# Patient Record
Sex: FEMALE | Race: WHITE | NOT HISPANIC OR LATINO | URBAN - METROPOLITAN AREA
[De-identification: names, ages, dates, MRNs, and addresses within clinical notes are randomized per-mention and may not be internally consistent; named-entity substitution may affect disease eponyms.]

---

## 2019-01-21 ENCOUNTER — INITIAL CONSULT (OUTPATIENT)
Dept: PSYCHIATRY | Facility: CLINIC | Age: 79
End: 2019-01-21
Payer: COMMERCIAL

## 2019-01-21 VITALS — WEIGHT: 156 LBS | SYSTOLIC BLOOD PRESSURE: 163 MMHG | DIASTOLIC BLOOD PRESSURE: 74 MMHG | HEART RATE: 63 BPM

## 2019-01-21 DIAGNOSIS — F34.1 DYSTHYMIC DISORDER: ICD-10-CM

## 2019-01-21 DIAGNOSIS — F33.2 MAJOR DEPRESSIVE DISORDER, RECURRENT, SEVERE WITHOUT PSYCHOTIC FEATURES: Primary | ICD-10-CM

## 2019-01-21 DIAGNOSIS — I10 ESSENTIAL HYPERTENSION: ICD-10-CM

## 2019-01-21 PROCEDURE — 3078F DIAST BP <80 MM HG: CPT | Mod: CPTII,S$GLB,, | Performed by: PSYCHIATRY & NEUROLOGY

## 2019-01-21 PROCEDURE — 99999 PR PBB SHADOW E&M-NEW PATIENT-LVL III: CPT | Mod: PBBFAC,,, | Performed by: PSYCHIATRY & NEUROLOGY

## 2019-01-21 PROCEDURE — 3077F PR MOST RECENT SYSTOLIC BLOOD PRESSURE >= 140 MM HG: ICD-10-PCS | Mod: CPTII,S$GLB,, | Performed by: PSYCHIATRY & NEUROLOGY

## 2019-01-21 PROCEDURE — 3078F PR MOST RECENT DIASTOLIC BLOOD PRESSURE < 80 MM HG: ICD-10-PCS | Mod: CPTII,S$GLB,, | Performed by: PSYCHIATRY & NEUROLOGY

## 2019-01-21 PROCEDURE — 99999 PR PBB SHADOW E&M-NEW PATIENT-LVL III: ICD-10-PCS | Mod: PBBFAC,,, | Performed by: PSYCHIATRY & NEUROLOGY

## 2019-01-21 PROCEDURE — 3077F SYST BP >= 140 MM HG: CPT | Mod: CPTII,S$GLB,, | Performed by: PSYCHIATRY & NEUROLOGY

## 2019-01-21 PROCEDURE — 99205 PR OFFICE/OUTPT VISIT, NEW, LEVL V, 60-74 MIN: ICD-10-PCS | Mod: S$GLB,,, | Performed by: PSYCHIATRY & NEUROLOGY

## 2019-01-21 PROCEDURE — 99205 OFFICE O/P NEW HI 60 MIN: CPT | Mod: S$GLB,,, | Performed by: PSYCHIATRY & NEUROLOGY

## 2019-01-21 RX ORDER — ALPRAZOLAM 0.5 MG/1
0.5 TABLET ORAL 2 TIMES DAILY PRN
COMMUNITY

## 2019-01-21 RX ORDER — SPIRONOLACTONE 25 MG/1
25 TABLET ORAL EVERY MORNING
COMMUNITY

## 2019-01-21 RX ORDER — METOPROLOL SUCCINATE 25 MG/1
25 TABLET, EXTENDED RELEASE ORAL EVERY MORNING
COMMUNITY

## 2019-01-21 RX ORDER — ATORVASTATIN CALCIUM 40 MG/1
40 TABLET, FILM COATED ORAL NIGHTLY
COMMUNITY

## 2019-01-21 RX ORDER — ARIPIPRAZOLE 2 MG/1
5 TABLET ORAL EVERY MORNING
COMMUNITY

## 2019-01-21 RX ORDER — MIRTAZAPINE 15 MG/1
15 TABLET, FILM COATED ORAL NIGHTLY
COMMUNITY

## 2019-01-21 NOTE — PATIENT INSTRUCTIONS
Potential benefits, alternatives, and risks of ECT were described in detail, including the possibility of death, stroke, MCI, burst aneurysm, dental or TMJ injury, aspiration, muscle aches, headache, memory loss, and others.  Pt. Was told that the literature supports a 60-70% success rate for ECT even in patients who have failed multiple medications.    She understands that she will continue to take medications through and after ECT, although not necessarily the ones she is on now.    She was informed that she will not be able to drive or conduct business while tx is in progress.    Incompatibility of benzodiazepines with ECT was covered.  She will ask Dr. Holder to supervise a safe taper off of Xanax before ECT begins.  She was informed that ECT is not a treatment for anxiety.    Pt. Was invited to come the the ER for admission if she ever feels she might hurt herself or others.

## 2019-01-21 NOTE — Clinical Note
Pt or family will call if they decide to proceed with ECT.  She will need a medical clearance form and clearance will need to be by a Cardiologist.  Please mail a copy of this not to Dr. Holder.

## 2019-01-21 NOTE — PROGRESS NOTES
Outpatient Psychiatry Initial Visit (MD/NP)    1/21/2019    Odalis GONSALEZ Faia, a 78 y.o. female, presenting for initial evaluation visit. Met with patient, sister and spouse.    Reason for Encounter: Consult from Dr. Chris Holder. Patient complains of   Chief Complaint   Patient presents with    Depression    Anhedonia    Irritability    Psychomotor Retardation    Anergia    Anxiety   .    History of Present Illness:  Ms. 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 Holder to determine whether ECT might be of value in treating persistent depression.  Pt reports that she has been depressed more days than not for the past 6 years since the death of her only son from heart failure.  She reported that she has been continually depressed for the past year.  Symptoms include: sadness, desire to cry, anger, irritability, anhedonia, hopelessness, poor concentration, lack of motivation and energy, isolation, rumination, a 40 lb wt loss over 1 year, and mid-insomnia despite medication.  She denies suicidal or violent thoughts.  Additional anxiety symptoms include compulsive checking without obsessive thoughts, social anxiety, and generalized anxiety.  She denies kapil, hypomania, hallucinations, or delusions, and reports little benefit from current medications.    Past Psychiatric History:  Pt describes chaos and argument between her parents when she was a child.  She denies physical or sexual abuse.  She states she began taking medications for anxiety in her 20's and first became depressed around age 59 while trying to decide on a move to a new home.  She was first followed by a psychiatrist at that time and recovered on unknown medication.  She became depressed and anxious again 6 years ago following the death of her son.  She has had individual psychotherapy, but has never been suicidal, homicidal, or been admitted to a psychiatric hospital or day program.  Past medication  trials include trazodone, Trintellix, Lexapro, and other medicines whose names are not recalled.  She has never had TMS or ECT.    Review Of Systems:     GENERAL:  40 lb wt loss  SKIN:  No rashes or lacerations  HEAD:  No headaches  EYES:  S/p cataract surgery  EARS:  No dizziness, tinnitus or hearing loss  NOSE:  No changes in smell  MOUTH & THROAT:  No dyskinetic movements or obvious goiter  CHEST:  No shortness of breath, hyperventilation or cough  CARDIOVASCULAR:  HTN.  Possible hx of angina 1 year ago.  ABDOMEN:  No nausea, vomiting, pain, constipation or diarrhea  URINARY:  No frequency, dysuria or sexual dysfunction  ENDOCRINE:  No polydipsia, polyuria  MUSCULOSKELETAL:  No pain or stiffness of the joints  NEUROLOGIC:  No weakness, sensory changes, seizures, confusion, memory loss, tremor or other abnormal movements    Current Evaluation:     Nutritional Screening: Considering the patient's height and weight, medications, medical history and preferences, should a referral be made to the dietitian? no    Constitutional  Vitals:  Most recent vital signs, dated less than 90 days prior to this appointment, were reviewed.    Vitals:    01/21/19 1334 01/21/19 1510   BP: (!) 162/67 (!) 163/74   Pulse: 68 63   Weight: 70.8 kg (156 lb)         General:  age appropriate, casually dressed     Musculoskeletal  Muscle Strength/Tone:  no rigidity, no dyskinesia, no dystonia, no tremor   Gait & Station:  non-ataxic     Psychiatric  Speech:  no latency; no press, soft, spontaneous   Mood & Affect:  depressed  mood-congruent   Thought Process:  goal-directed, logical   Associations:  intact   Thought Content:  normal, no suicidality, no homicidality, delusions, or paranoia   Insight:  has awareness of illness   Judgement: behavior is adequate to circumstances   Orientation:  grossly intact   Memory: intact for content of interview   Language: grossly intact   Attention Span & Concentration:  able to focus   Fund of Knowledge:   intact and appropriate to age and level of education       Relevant Elements of Neurological Exam: normal gait    Functioning in Relationships:  Spouse/partner:  Supportive.  Peers:  1 friend outside family.  Employers: Homemaker.    Laboratory Data  No visits with results within 1 Month(s) from this visit.   Latest known visit with results is:   No results found for any previous visit.         Medications  Outpatient Encounter Medications as of 1/21/2019   Medication Sig Dispense Refill    ALPRAZolam (XANAX) 0.5 MG tablet Take 0.5 mg by mouth 2 (two) times daily as needed for Anxiety.      ARIPiprazole (ABILIFY) 2 MG Tab Take 5 mg by mouth every morning.      atorvastatin (LIPITOR) 40 MG tablet Take 40 mg by mouth every evening.      metoprolol succinate (TOPROL-XL) 25 MG 24 hr tablet Take 25 mg by mouth every morning.      mirtazapine (REMERON) 15 MG tablet Take 15 mg by mouth every evening.      spironolactone (ALDACTONE) 25 MG tablet Take 25 mg by mouth every morning.       No facility-administered encounter medications on file as of 1/21/2019.            Assessment - Diagnosis - Goals:     Impression: Pt has MDD without psychosis superimposed on Dysthymic Disorder, poorly responsive to medications.  She is a candidate for ECT pending cardiac clearance.      ICD-10-CM ICD-9-CM   1. Major depressive disorder, recurrent, severe without psychotic features F33.2 296.33   2. Dysthymic disorder F34.1 300.4   3. Essential hypertension I10 401.9       Strengths and Liabilities: Strength: Patient accepts guidance/feedback, Strength: Patient is motivated for change., Strength: Patient has positive support network., Strength: Patient has reasonable judgment.    Treatment Goals:  Specify outcomes written in observable, behavioral terms:   Depression: eliminating all depressive symptoms (BDI score <10 for 1 month)    Treatment Plan/Recommendations:   · Medication Management: Medications will remain under supervision  of Dr. Holder until ECT begins.  She should taper off Xanax by 1 week prior to 1st treatment.   · Pt and her family will discuss ECT and contact Adrianna Dudley to schedule a start date if they decide to proceed.      Return to Clinic: for ECT    Counseling time: 65 min  Total time: 75 min.    Consulting clinician was informed of the encounter and consult note.

## 2019-02-12 ENCOUNTER — TELEPHONE (OUTPATIENT)
Dept: PSYCHIATRY | Facility: CLINIC | Age: 79
End: 2019-02-12

## 2019-02-12 NOTE — TELEPHONE ENCOUNTER
"----- Message from Adrianna Dudley RN sent at 2/11/2019 12:25 PM CST -----  Contact: pt spouse   Spoke with patient and her  today regarding their hesitance with proceeding with ECT. They said they think they "came on really strong to you about her symptoms, but do not feel things are as bad that ECT is the next step they want to make at this time." They are however  wanting to move on from Dr. Figueredo regarding med management. They were wanting to see if you would take her on as a patient of yours. I explained that you were now part time and did not have the availability for new patients in the schedule, but I would reach out to you for your recommendations. They wanted to know your recommendation in our department if they were to transfer ? They also mentioned TMS. Wanted your direct input if they should seek an appointment for TMS consultation prior to switching psychiatrists ?I told them I would call them back with your input.   ----- Message -----  From: Maricruz Greene  Sent: 2/7/2019  12:02 PM  To: Adrianna Dudley RN    Calling has a couple of questions regarding treatment/   His cb #   [phone]    "

## 2019-02-12 NOTE — TELEPHONE ENCOUNTER
----- Message from Leisa Arciniega MA sent at 2/11/2019 11:20 AM CST -----  [phone]    Please call Christian coker.  He has a question.

## 2025-01-08 ENCOUNTER — OUTSIDE PLACE OF SERVICE (OUTPATIENT)
Dept: ADMINISTRATIVE | Facility: OTHER | Age: 85
End: 2025-01-08
Payer: COMMERCIAL

## 2025-01-08 PROCEDURE — 99305 1ST NF CARE MODERATE MDM 35: CPT | Mod: ,,, | Performed by: FAMILY MEDICINE

## 2025-01-29 ENCOUNTER — HOSPITAL ENCOUNTER (EMERGENCY)
Facility: HOSPITAL | Age: 85
End: 2025-01-30
Attending: STUDENT IN AN ORGANIZED HEALTH CARE EDUCATION/TRAINING PROGRAM
Payer: MEDICARE

## 2025-01-29 DIAGNOSIS — R29.818 ACUTE FOCAL NEUROLOGICAL DEFICIT: ICD-10-CM

## 2025-01-29 DIAGNOSIS — R29.810 FACIAL DROOP: Primary | ICD-10-CM

## 2025-01-29 PROBLEM — R53.1 LEFT-SIDED WEAKNESS: Status: ACTIVE | Noted: 2025-01-29

## 2025-01-29 PROBLEM — I10 HYPERTENSION: Status: ACTIVE | Noted: 2022-06-21

## 2025-01-29 PROBLEM — G81.91 HEMIPARESIS OF RIGHT DOMINANT SIDE: Status: ACTIVE | Noted: 2023-12-07

## 2025-01-29 PROBLEM — F32.9 DEPRESSION, MAJOR: Status: ACTIVE | Noted: 2022-06-21

## 2025-01-29 PROBLEM — E78.00 HYPERCHOLESTEROLEMIA: Status: ACTIVE | Noted: 2025-01-29

## 2025-01-29 PROBLEM — N18.30 STAGE 3 CHRONIC KIDNEY DISEASE: Status: ACTIVE | Noted: 2022-06-21

## 2025-01-29 PROBLEM — G36.0 NEUROMYELITIS OPTICA: Status: ACTIVE | Noted: 2023-11-13

## 2025-01-29 PROBLEM — F51.01 PRIMARY INSOMNIA: Status: ACTIVE | Noted: 2022-06-21

## 2025-01-29 PROBLEM — I95.1 ORTHOSTATIC HYPOTENSION: Status: ACTIVE | Noted: 2024-12-16

## 2025-01-29 PROBLEM — Z86.73 HISTORY OF TIA (TRANSIENT ISCHEMIC ATTACK): Status: ACTIVE | Noted: 2024-02-05

## 2025-01-29 LAB
ALBUMIN SERPL BCP-MCNC: 3.4 G/DL (ref 3.5–5.2)
ALP SERPL-CCNC: 57 U/L (ref 40–150)
ALT SERPL W/O P-5'-P-CCNC: 13 U/L (ref 10–44)
ANION GAP SERPL CALC-SCNC: 10 MMOL/L (ref 8–16)
AST SERPL-CCNC: 18 U/L (ref 10–40)
BASOPHILS # BLD AUTO: 0.04 K/UL (ref 0–0.2)
BASOPHILS NFR BLD: 0.4 % (ref 0–1.9)
BILIRUB SERPL-MCNC: 0.3 MG/DL (ref 0.1–1)
BUN SERPL-MCNC: 18 MG/DL (ref 8–23)
CALCIUM SERPL-MCNC: 8.8 MG/DL (ref 8.7–10.5)
CHLORIDE SERPL-SCNC: 106 MMOL/L (ref 95–110)
CHOLEST SERPL-MCNC: 129 MG/DL (ref 120–199)
CHOLEST/HDLC SERPL: 2.9 {RATIO} (ref 2–5)
CO2 SERPL-SCNC: 26 MMOL/L (ref 23–29)
CREAT SERPL-MCNC: 1 MG/DL (ref 0.5–1.4)
CREAT SERPL-MCNC: 1.1 MG/DL (ref 0.5–1.4)
DIFFERENTIAL METHOD BLD: ABNORMAL
EOSINOPHIL # BLD AUTO: 0.1 K/UL (ref 0–0.5)
EOSINOPHIL NFR BLD: 1.2 % (ref 0–8)
ERYTHROCYTE [DISTWIDTH] IN BLOOD BY AUTOMATED COUNT: 12.9 % (ref 11.5–14.5)
EST. GFR  (NO RACE VARIABLE): 55.9 ML/MIN/1.73 M^2
GLUCOSE SERPL-MCNC: 131 MG/DL (ref 70–110)
HCT VFR BLD AUTO: 36.5 % (ref 37–48.5)
HCV AB SERPL QL IA: NORMAL
HDLC SERPL-MCNC: 44 MG/DL (ref 40–75)
HDLC SERPL: 34.1 % (ref 20–50)
HGB BLD-MCNC: 12.3 G/DL (ref 12–16)
HIV 1+2 AB+HIV1 P24 AG SERPL QL IA: NORMAL
IMM GRANULOCYTES # BLD AUTO: 0.04 K/UL (ref 0–0.04)
IMM GRANULOCYTES NFR BLD AUTO: 0.4 % (ref 0–0.5)
INR PPP: 1 (ref 0.8–1.2)
LDLC SERPL CALC-MCNC: 65.2 MG/DL (ref 63–159)
LYMPHOCYTES # BLD AUTO: 2.1 K/UL (ref 1–4.8)
LYMPHOCYTES NFR BLD: 22.9 % (ref 18–48)
MCH RBC QN AUTO: 30.4 PG (ref 27–31)
MCHC RBC AUTO-ENTMCNC: 33.7 G/DL (ref 32–36)
MCV RBC AUTO: 90 FL (ref 82–98)
MONOCYTES # BLD AUTO: 0.9 K/UL (ref 0.3–1)
MONOCYTES NFR BLD: 10 % (ref 4–15)
NEUTROPHILS # BLD AUTO: 6 K/UL (ref 1.8–7.7)
NEUTROPHILS NFR BLD: 65.1 % (ref 38–73)
NONHDLC SERPL-MCNC: 85 MG/DL
NRBC BLD-RTO: 0 /100 WBC
PLATELET # BLD AUTO: 244 K/UL (ref 150–450)
PMV BLD AUTO: 10.5 FL (ref 9.2–12.9)
POC PTINR: 1.3 (ref 0.9–1.2)
POC PTWBT: 12.8 SEC (ref 9.7–14.3)
POCT GLUCOSE: 144 MG/DL (ref 70–110)
POTASSIUM SERPL-SCNC: 3.5 MMOL/L (ref 3.5–5.1)
PROT SERPL-MCNC: 6 G/DL (ref 6–8.4)
PROTHROMBIN TIME: 11.3 SEC (ref 9–12.5)
RBC # BLD AUTO: 4.05 M/UL (ref 4–5.4)
SAMPLE: ABNORMAL
SAMPLE: NORMAL
SODIUM SERPL-SCNC: 142 MMOL/L (ref 136–145)
T4 FREE SERPL-MCNC: 1.03 NG/DL (ref 0.71–1.51)
TRIGL SERPL-MCNC: 99 MG/DL (ref 30–150)
TSH SERPL DL<=0.005 MIU/L-ACNC: 4.34 UIU/ML (ref 0.4–4)
WBC # BLD AUTO: 9.29 K/UL (ref 3.9–12.7)

## 2025-01-29 PROCEDURE — 93010 ELECTROCARDIOGRAM REPORT: CPT | Mod: ,,, | Performed by: INTERNAL MEDICINE

## 2025-01-29 PROCEDURE — 93005 ELECTROCARDIOGRAM TRACING: CPT

## 2025-01-29 PROCEDURE — 85025 COMPLETE CBC W/AUTO DIFF WBC: CPT | Performed by: STUDENT IN AN ORGANIZED HEALTH CARE EDUCATION/TRAINING PROGRAM

## 2025-01-29 PROCEDURE — 82565 ASSAY OF CREATININE: CPT

## 2025-01-29 PROCEDURE — 80053 COMPREHEN METABOLIC PANEL: CPT | Performed by: STUDENT IN AN ORGANIZED HEALTH CARE EDUCATION/TRAINING PROGRAM

## 2025-01-29 PROCEDURE — 99284 EMERGENCY DEPT VISIT MOD MDM: CPT | Mod: ,,, | Performed by: STUDENT IN AN ORGANIZED HEALTH CARE EDUCATION/TRAINING PROGRAM

## 2025-01-29 PROCEDURE — 82962 GLUCOSE BLOOD TEST: CPT

## 2025-01-29 PROCEDURE — 85610 PROTHROMBIN TIME: CPT | Performed by: STUDENT IN AN ORGANIZED HEALTH CARE EDUCATION/TRAINING PROGRAM

## 2025-01-29 PROCEDURE — 84439 ASSAY OF FREE THYROXINE: CPT | Performed by: STUDENT IN AN ORGANIZED HEALTH CARE EDUCATION/TRAINING PROGRAM

## 2025-01-29 PROCEDURE — 25500020 PHARM REV CODE 255: Performed by: STUDENT IN AN ORGANIZED HEALTH CARE EDUCATION/TRAINING PROGRAM

## 2025-01-29 PROCEDURE — 99285 EMERGENCY DEPT VISIT HI MDM: CPT | Mod: 25

## 2025-01-29 PROCEDURE — 85610 PROTHROMBIN TIME: CPT

## 2025-01-29 PROCEDURE — 87389 HIV-1 AG W/HIV-1&-2 AB AG IA: CPT | Performed by: PHYSICIAN ASSISTANT

## 2025-01-29 PROCEDURE — 84443 ASSAY THYROID STIM HORMONE: CPT | Performed by: STUDENT IN AN ORGANIZED HEALTH CARE EDUCATION/TRAINING PROGRAM

## 2025-01-29 PROCEDURE — 86803 HEPATITIS C AB TEST: CPT | Performed by: PHYSICIAN ASSISTANT

## 2025-01-29 PROCEDURE — 99900035 HC TECH TIME PER 15 MIN (STAT)

## 2025-01-29 PROCEDURE — 80061 LIPID PANEL: CPT | Performed by: STUDENT IN AN ORGANIZED HEALTH CARE EDUCATION/TRAINING PROGRAM

## 2025-01-29 RX ORDER — ESCITALOPRAM OXALATE 20 MG/1
TABLET ORAL
COMMUNITY
Start: 2024-11-18

## 2025-01-29 RX ORDER — MIDODRINE HYDROCHLORIDE 5 MG/1
5 TABLET ORAL 2 TIMES DAILY
COMMUNITY

## 2025-01-29 RX ORDER — DOCUSATE SODIUM 100 MG/1
CAPSULE, LIQUID FILLED ORAL
COMMUNITY

## 2025-01-29 RX ORDER — FLUDROCORTISONE ACETATE 0.1 MG/1
TABLET ORAL
COMMUNITY

## 2025-01-29 RX ORDER — ONDANSETRON 4 MG/1
TABLET, FILM COATED ORAL
COMMUNITY

## 2025-01-29 RX ORDER — INEBILIZUMAB 10 MG/ML
INJECTION INTRAVENOUS
COMMUNITY
Start: 2025-01-27

## 2025-01-29 RX ORDER — FERROUS GLUCONATE 324(38)MG
1 TABLET ORAL DAILY
COMMUNITY
Start: 2024-11-19

## 2025-01-29 RX ORDER — ARIPIPRAZOLE 5 MG/1
TABLET ORAL
COMMUNITY
Start: 2024-11-18

## 2025-01-29 RX ORDER — POLYETHYLENE GLYCOL 3350 17 G/17G
POWDER, FOR SOLUTION ORAL
COMMUNITY
Start: 2025-01-03

## 2025-01-29 RX ORDER — MECLIZINE HCL 12.5 MG 12.5 MG/1
TABLET ORAL
COMMUNITY
Start: 2024-12-15

## 2025-01-29 RX ORDER — ACETAMINOPHEN 325 MG/1
TABLET ORAL
COMMUNITY
Start: 2025-01-03

## 2025-01-29 RX ORDER — METOPROLOL SUCCINATE 50 MG/1
TABLET, EXTENDED RELEASE ORAL
COMMUNITY
Start: 2024-11-18

## 2025-01-29 RX ORDER — CALCIUM CARBONATE 200(500)MG
500 TABLET,CHEWABLE ORAL DAILY PRN
COMMUNITY
Start: 2024-11-19

## 2025-01-29 RX ORDER — CLOPIDOGREL BISULFATE 75 MG/1
TABLET ORAL
COMMUNITY
Start: 2024-11-18

## 2025-01-29 RX ORDER — PRIMIDONE 50 MG/1
TABLET ORAL
COMMUNITY
Start: 2024-04-17

## 2025-01-29 RX ORDER — ATORVASTATIN CALCIUM 80 MG/1
TABLET, FILM COATED ORAL
COMMUNITY
Start: 2024-10-18

## 2025-01-29 RX ORDER — ASPIRIN 81 MG/1
TABLET ORAL
COMMUNITY
Start: 2024-11-19

## 2025-01-29 RX ADMIN — IOHEXOL 75 ML: 350 INJECTION, SOLUTION INTRAVENOUS at 07:01

## 2025-01-30 VITALS
SYSTOLIC BLOOD PRESSURE: 160 MMHG | RESPIRATION RATE: 17 BRPM | BODY MASS INDEX: 29.89 KG/M2 | HEIGHT: 64 IN | WEIGHT: 175.06 LBS | OXYGEN SATURATION: 100 % | DIASTOLIC BLOOD PRESSURE: 77 MMHG | HEART RATE: 76 BPM | TEMPERATURE: 98 F

## 2025-01-30 LAB
OHS QRS DURATION: 80 MS
OHS QTC CALCULATION: 452 MS

## 2025-01-30 NOTE — PROVIDER PROGRESS NOTES - EMERGENCY DEPT.
Patient was signed out to me by Dr. Gambino pending MRI. Briefly, this is a 83yo F with facial droop, Cta and MRI prelim negative for stroke, likely TIA.    Data:  Results for orders placed or performed during the hospital encounter of 01/29/25   POCT glucose    Collection Time: 01/29/25  7:44 PM   Result Value Ref Range    POCT Glucose 144 (H) 70 - 110 mg/dL   CBC W/ AUTO DIFFERENTIAL    Collection Time: 01/29/25  7:47 PM   Result Value Ref Range    WBC 9.29 3.90 - 12.70 K/uL    RBC 4.05 4.00 - 5.40 M/uL    Hemoglobin 12.3 12.0 - 16.0 g/dL    Hematocrit 36.5 (L) 37.0 - 48.5 %    MCV 90 82 - 98 fL    MCH 30.4 27.0 - 31.0 pg    MCHC 33.7 32.0 - 36.0 g/dL    RDW 12.9 11.5 - 14.5 %    Platelets 244 150 - 450 K/uL    MPV 10.5 9.2 - 12.9 fL    Immature Granulocytes 0.4 0.0 - 0.5 %    Gran # (ANC) 6.0 1.8 - 7.7 K/uL    Immature Grans (Abs) 0.04 0.00 - 0.04 K/uL    Lymph # 2.1 1.0 - 4.8 K/uL    Mono # 0.9 0.3 - 1.0 K/uL    Eos # 0.1 0.0 - 0.5 K/uL    Baso # 0.04 0.00 - 0.20 K/uL    nRBC 0 0 /100 WBC    Gran % 65.1 38.0 - 73.0 %    Lymph % 22.9 18.0 - 48.0 %    Mono % 10.0 4.0 - 15.0 %    Eosinophil % 1.2 0.0 - 8.0 %    Basophil % 0.4 0.0 - 1.9 %    Differential Method Automated    Comprehensive metabolic panel    Collection Time: 01/29/25  7:47 PM   Result Value Ref Range    Sodium 142 136 - 145 mmol/L    Potassium 3.5 3.5 - 5.1 mmol/L    Chloride 106 95 - 110 mmol/L    CO2 26 23 - 29 mmol/L    Glucose 131 (H) 70 - 110 mg/dL    BUN 18 8 - 23 mg/dL    Creatinine 1.0 0.5 - 1.4 mg/dL    Calcium 8.8 8.7 - 10.5 mg/dL    Total Protein 6.0 6.0 - 8.4 g/dL    Albumin 3.4 (L) 3.5 - 5.2 g/dL    Total Bilirubin 0.3 0.1 - 1.0 mg/dL    Alkaline Phosphatase 57 40 - 150 U/L    AST 18 10 - 40 U/L    ALT 13 10 - 44 U/L    eGFR 55.9 (A) >60 mL/min/1.73 m^2    Anion Gap 10 8 - 16 mmol/L   Protime-INR    Collection Time: 01/29/25  7:47 PM   Result Value Ref Range    Prothrombin Time 11.3 9.0 - 12.5 sec    INR 1.0 0.8 - 1.2   TSH    Collection  Time: 01/29/25  7:47 PM   Result Value Ref Range    TSH 4.337 (H) 0.400 - 4.000 uIU/mL   LDL - Lipid Panel    Collection Time: 01/29/25  7:47 PM   Result Value Ref Range    Cholesterol 129 120 - 199 mg/dL    Triglycerides 99 30 - 150 mg/dL    HDL 44 40 - 75 mg/dL    LDL Cholesterol 65.2 63.0 - 159.0 mg/dL    HDL/Cholesterol Ratio 34.1 20.0 - 50.0 %    Total Cholesterol/HDL Ratio 2.9 2.0 - 5.0    Non-HDL Cholesterol 85 mg/dL   Hepatitis C Antibody    Collection Time: 01/29/25  7:47 PM   Result Value Ref Range    Hepatitis C Ab Non-reactive Non-reactive   HIV 1/2 Ag/Ab (4th Gen)    Collection Time: 01/29/25  7:47 PM   Result Value Ref Range    HIV 1/2 Ag/Ab Non-reactive Non-reactive   T4, Free    Collection Time: 01/29/25  7:47 PM   Result Value Ref Range    Free T4 1.03 0.71 - 1.51 ng/dL   ISTAT PROCEDURE    Collection Time: 01/29/25  7:53 PM   Result Value Ref Range    POC PTWBT 12.8 9.7 - 14.3 sec    POC PTINR 1.3 (H) 0.9 - 1.2    Sample unknown    ISTAT CREATININE    Collection Time: 01/29/25  7:56 PM   Result Value Ref Range    POC Creatinine 1.1 0.5 - 1.4 mg/dL    Sample unknown       Imaging Results              MRI Brain Without Contrast (Final result)  Result time 01/29/25 23:20:44      Final result by Joni Otoole MD (01/29/25 23:20:44)                   Impression:      No acute infarction.    Chronic microvascular ischemic changes.  Generalized cerebral volume loss and mildly prominent ventricles as seen previously.    Electronically signed by resident: Kari Pena  Date:    01/29/2025  Time:    23:01    Electronically signed by: Joni Otoole MD  Date:    01/29/2025  Time:    23:20               Narrative:    EXAMINATION:  MRI BRAIN WITHOUT CONTRAST    CLINICAL HISTORY:  Neuro deficit, acute, stroke suspected;    TECHNIQUE:  Multiplanar multisequence MR imaging of the brain was performed without intravenous contrast.    COMPARISON:  CTA multiphase 01/29/25 outside MRI brain report dated  12/19/2023    FINDINGS:  Intracranial Compartment:    Mild generalized cerebral volume loss with compensatory prominence of ventricles and subarachnoid spaces.    Patchy and confluent T2/FLAIR hyperintensity in the supratentorial white matter, nonspecific but most likely reflecting chronic small vessel ischemic changes. No recent or remote major vascular distribution infarct. No recent or remote hemorrhage.  No mass effect or midline shift.    No extra-axial blood or fluid collection.  Small lipomas in the falx.    Normal vascular flow voids are preserved.    Skull/Extracranial Contents (limited evaluation):    Bone marrow signal intensity is unremarkable.    Bilateral pseudophakia.                                       CTA STROKE MULTI-PHASE (Final result)  Result time 01/29/25 21:02:53      Final result by Joni Otoole MD (01/29/25 21:02:53)                   Impression:      No acute intracranial process, specifically no acute intracranial hemorrhage or major vascular territory infarct.  If concern persists for acute ischemic event consider MRI brain for further evaluation.    Mild chronic microvascular ischemic change.    Intermittent stenosis of the left greater than right anterior cerebral arteries.  Otherwise, no high-grade stenosis or large vessel occlusion of the intracranial or extracranial vasculature.  No significant stenosis at the carotid bifurcations per NASCET criteria.    2 mm infundibulum left A1 anterior communicating junction.    Electronically signed by resident: Kari Pena  Date:    01/29/2025  Time:    20:16    Electronically signed by: Joni Otoole MD  Date:    01/29/2025  Time:    21:02               Narrative:    EXAMINATION:  CTA STROKE MULTI-PHASE    CLINICAL HISTORY:  Neuro deficit, acute, stroke suspected;    TECHNIQUE:  Axial CT images obtained throughout the region of the head before and after the administration of intravenous contrast.  CT angiogram was performed from  through the cervical and intracranial vasculature during the IV bolus administration of 75mL of Omnipaque 350.  Multiplanar MPR and MIP reformats were performed.    The examination was performed using a multiphase protocol with 2 additional delayed images through the brain.    CT source data was analyzed using artificial intelligence software for detection of a large vessel occlusions (LVO) in order to enable computer assisted triage notification and aid clinical stroke decision making.    COMPARISON:  No priors.    FINDINGS:  Mild generalized cerebral volume loss with compensatory prominence of the ventricles and sulci up arachnoid spaces.  The brain parenchyma maintains gray-white matter differentiation. Mild patchy hypoattenuation in the supratentorial white matter, nonspecific but may reflect mild chronic microvascular ischemic changes.  No intraparenchymal hemorrhage, edema or mass.  No evidence of acute major vascular distribution infarct.    Ventricles are normal in size and configuration for age.  No hydrocephalus or midline shift.    No new extra-axial blood or fluid collection.    No displaced calvarial fracture.    Mastoid air cells and paranasal sinuses are essentially clear.    Bilateral pseudophakia.    Vascular calcifications at the skull base.    Salivary glands are without significant abnormality.    0.5 cm right thyroid lobe nodule.    Pharynx and prevertebral soft tissues are without significant abnormality.    Trachea is clear.  Visualized lungs are without significant abnormality.  Small volume pericardial fluid in the superior mediastinum.  Low-density lymphadenopathy is also a consideration.    Degenerative changes of the cervical spine.      CTA:    Left sided three-vessel aortic arch. Mild scattered atherosclerosis of the aortic arch.  Main pulmonary artery is patent without large saddle pulmonary embolus or central pulmonary thromboembolism on this nondedicated exam.    Right common carotid  artery is patent without significant stenosis. Right carotid bifurcation contains minimal atherosclerosis representing less than 50% stenosis per NASCET criteria. Right internal carotid artery is patent without significant stenosis.  Multifocal calcification of the cavernous and supraclinoid right ICA without significant stenosis or focal occlusion.    Left common carotid artery is patent without significant stenosis. Left carotid bifurcation contains minimal atherosclerosis representing less than 50% stenosis per NASCET criteria. Left internal carotid artery is patent without significant stenosis.  Multifocal calcification of the cavernous and supraclinoid left ICA without significant stenosis or focal occlusion.    Calcification at the right vertebral artery origin which remains patent.  Right vertebral artery is patent without significant stenosis.    Left vertebral artery origin is patent.  Left vertebral artery is patent with focal calcification at the distal V4 segment with moderate stenosis.    Basilar artery is patent without significant stenosis.    Small caliber left A1 and A2 may represent significant stenosis.  2 mm infundibulum left A1 anterior communicating junction (sagittal series 606, image 48).  Right anterior cerebral artery is patent with mild intermittent stenosis.  Moderate atherosclerosis in the intracranial arteries.  Middle cerebral arteries are patent without significant stenosis or aneurysm.  Posterior cerebral arteries are patent without significant stenosis or aneurysm.    Dural venous sinuses and cerebral veins are patent.                                       MDM:   MRI negative for stroke, patient stable for DC home with outpatient neurology follow up.

## 2025-01-30 NOTE — CONSULTS
[facility] Dept  Vascular Neurology  [facility]  Consult Note    Inpatient consult to Neurology Services (Vascular Neurology)  Consult performed by: Patrice Huffman DO  Consult ordered by: Carlos Enrique Gambino MD        Assessment/Plan:     Patient is a 84 y.o. year old female with:    Left-sided weakness  84F. 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 No home anti-coagulation. Takes Plavix, reports compliance. Presents for left-sided facial droop, LUE weakness, dysarthria. Began abruptly at 17:50 today. She reports that the dysarthria has improved and the LUE weakness has resolved while en route w/EMS. NIHSS is 3: dysarthria, facial droop (left lower), likely extinction on the left (though inconsistent). With improvement in symptoms and low NIHSS, TNK contraindicated. No LVO on CTA for IR intervention.    Recommendations  -MRI brain w/o contrast  -vascular neurology will follow for the results of the above  -contact us with questions/concerns         STROKE DOCUMENTATION     Acute Stroke Times   Last Known Normal Date: 01/29/25  Last Known Normal Time: 1750  Symptom Onset Date: 01/29/25  Symptom Onset Time: 1750  Stroke Team Called Date: 01/29/25  Stroke Team Called Time: 1945  Stroke Team Arrival Date: 01/29/25  Stroke Team Arrival Time: 1952  CT Interpretation Time: 2000  Thrombolytic Therapy Recommended: No  CTA Interpretation Time: 2005  Thrombectomy Recommended: No    NIH Scale:  1a. Level of Consciousness: 0-->Alert, keenly responsive  1b. LOC Questions: 0-->Answers both questions correctly  1c. LOC Commands: 0-->Performs both tasks correctly  2. Best Gaze: 0-->Normal  3. Visual: 0-->No visual loss  4. Facial Palsy: 1-->Minor paralysis (flattened nasolabial fold,  asymmetry on smiling) (L)  5a. Motor Arm, Left: 0-->No drift, limb holds 90 (or 45) degrees for full 10 secs  5b. Motor Arm, Right: 0-->No drift, limb holds 90 (or 45) degrees for full 10 secs  6a. Motor Leg, Left: 0-->No drift, leg holds 30 degree position for full 5 secs  6b. Motor Leg, Right: 0-->No drift, leg holds 30 degree position for full 5 secs  7. Limb Ataxia: 0-->Absent  8. Sensory: 0-->Normal, no sensory loss  9. Best Language: 0-->No aphasia, normal  10. Dysarthria: 1-->Mild-to-moderate dysarthria, patient slurs at least some words and, at worst, can be understood with some difficulty  11. Extinction and Inattention (formerly Neglect): 1-->Visual, tactile, auditory, spatial, or personal inattention or extinction to bilateral simultaneous stimulation in one of the sensory modalities (L (inconsistent))  Total (NIH Stroke Scale): 3    Modified [place]    Matthias Coma Scale:15   ABCD2 Score:    [id number] Score:   HAS -BLED Score:   ICH Score:   Hunt & Brown Classification:       Thrombolysis Candidate? No, Non-disabling symptoms - Low NIHSS     Delays to Thrombolysis?  Not Applicable    Interventional Revascularization Candidate?   Is the patient eligible for mechanical endovascular reperfusion (CARMELITA)?  No; No large vessel occlusion identified on imaging     Delays to Thrombectomy? Not Applicable    Hemorrhagic change of an Ischemic Stroke: Does this patient have an ischemic stroke with hemorrhagic changes? No     Subjective:     History of Present Illness:  84F. 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    Presents for left-sided facial droop, LUE weakness, dysarthria. Began abruptly at 17:50 today. She reports that the dysarthria has improved and the LUE weakness has resolved while  en route w/EMS. Stroke code called on arrival, vascular neurology consulted.          Past Medical History:   Diagnosis Date    Anxiety     Chest pain     Depression     Headache     HTN (hypertension)     Hx of psychiatric care     Leg cramps     Psychiatric problem     Restless legs     Sleep difficulties     Therapy      Past Surgical History:   Procedure Laterality Date    APPENDECTOMY      CATARACT EXTRACTION, BILATERAL      CHOLECYSTECTOMY      DENTAL SURGERY      TONSILLECTOMY      TOTAL ABDOMINAL HYSTERECTOMY       Social History     Tobacco Use    Smoking status: Never    Smokeless tobacco: Never   Substance Use Topics    Alcohol use: No    Drug use: No     Review of patient's allergies indicates:  No Known Allergies    Medications: I have reviewed the current medication administration record.    (Not in a hospital admission)      Review of Systems   Neurological:  Positive for weakness and numbness.     Objective:     Vital Signs (Most Recent):  Temp: 97.7 °F (36.5 °C) (01/29/25 1931)  Pulse: 68 (01/29/25 2007)  Resp: 16 (01/29/25 1931)  BP: (!) 167/70 (01/29/25 2007)  SpO2: 98 % (01/29/25 2007)    Vital Signs Range (Last 24H):  Temp:  [97.7 °F (36.5 °C)]   Pulse:  [68-72]   Resp:  [16]   BP: (152-167)/(66-70)   SpO2:  [97 %-98 %]        Physical Exam  Vitals and nursing note reviewed.   HENT:      Head: Normocephalic and atraumatic.      Mouth/Throat:      Mouth: Mucous membranes are moist.   Cardiovascular:      Rate and Rhythm: Normal rate and regular rhythm.   Pulmonary:      Effort: Pulmonary effort is normal. No respiratory distress.   Skin:     General: Skin is warm and dry.            Neurological Exam:   LOC: alert  Attention Span: Good   Language: No aphasia  Articulation: Dysarthria  Orientation: Person, Place, Time   Visual Fields: Full  EOM (CN III, IV, VI): Full/intact  Pupils (CN II, III): PERRL  Facial Movement (CN VII): Lower facial weakness on the Left  Motor: Arm left  Normal 5/5  Leg  "left  Normal 5/5  Arm right  Normal 5/5  Leg right Normal 5/5  Sensation: Intact to light touch, temperature and vibration and Tactile extinction to bilateral simultaneous stimulation       Laboratory:  CMP: No results for input(s): "GLUCOSE", "CALCIUM", "ALBUMIN", "PROT", "NA", "K", "CO2", "CL", "BUN", "CREATININE", "ALKPHOS", "ALT", "AST", "BILITOT" in the last 24 hours.  CBC: No results for input(s): "WBC", "RBC", "HGB", "HCT", "PLT", "MCV", "MCH", "MCHC" in the last 168 hours.  Lipid Panel: No results for input(s): "CHOL", "LDLCALC", "HDL", "TRIG" in the last 168 hours.  Coagulation:   Recent Labs   Lab 01/29/25 1953   INR 1.3*     Hgb A1C: No results for input(s): "HGBA1C" in the last 168 hours.  TSH: No results for input(s): "TSH" in the last 168 hours.    Diagnostic Results:    Vessel Imaging:  CTA Stroke Multiphase: 1/29  No LVO, significant area of hypoattenuation, no IPH.    Patrice Huffman DO  [facility]  Department of Vascular Neurology   [facility] Dept   "

## 2025-01-30 NOTE — ED PROVIDER NOTES
Encounter Date: 1/29/2025       History     Chief Complaint   Patient presents with    Facial Droop     Arrives via EMS from [facility]. LKN 1750. Pt showed left facial droop, left sided arm weakness, slurred speech. Episode lasted about 15-20 minutes. Weakness is back to baseline. Pt still has mild left sided facial droop. Speech has improved.      84-year-old female with PMH of TIA presents with left-sided facial droop.  Patient reports her symptoms started acutely a around 6:00 p.m..  She noticed left-sided facial droop, her speech sounded different compared to normal, and she had left-sided weakness.  The episode lasted about 20 minutes.  Currently she only feels the facial droop still but states her speech is back to normal.  Patient denies chest pain, shortness of breath, abdominal pain, nausea, vomiting, dysuria, hematuria, diarrhea, constipation, black/bloody stools.  Reports a history of similar symptoms 1 time but it didn't last as long as it did tonight.    Per EMS, patient did have facial droop and some left-sided weakness that improved EN route.  They did not administer any medications.    The history is provided by the patient, the EMS personnel and medical records.     Review of patient's allergies indicates:  No Known Allergies  Past Medical History:   Diagnosis Date    Anxiety     Chest pain     Depression     Headache     HTN (hypertension)     Hx of psychiatric care     Leg cramps     Psychiatric problem     Restless legs     Sleep difficulties     Therapy      Past Surgical History:   Procedure Laterality Date    APPENDECTOMY      CATARACT EXTRACTION, BILATERAL      CHOLECYSTECTOMY      DENTAL SURGERY      TONSILLECTOMY      TOTAL ABDOMINAL HYSTERECTOMY       Family History   Problem Relation Name Age of Onset    Dementia Mother      Alcohol abuse Father      Schizophrenia Brother      Bipolar disorder Other nephew     Drug abuse Neg Hx      Suicide Neg Hx       Social  History     Tobacco Use    Smoking status: Never    Smokeless tobacco: Never   Substance Use Topics    Alcohol use: No    Drug use: No     Review of Systems    Physical Exam     Initial Vitals [25]   BP Pulse Resp Temp SpO2   (!) 152/66 72 16 97.7 °F (36.5 °C) 97 %      MAP       --         Physical Exam    Constitutional: Vital signs are normal. She appears well-developed and well-nourished.   HENT:   Head: Normocephalic and atraumatic.   Eyes: Conjunctivae are normal.   Cardiovascular:  Normal rate, normal heart sounds and intact distal pulses.           Pulmonary/Chest: Breath sounds normal. No respiratory distress. She has no wheezes. She has no rhonchi. She has no rales.   Musculoskeletal:         General: No edema.     Neurological: She is alert. GCS eye subscore is 4. GCS verbal subscore is 5. GCS motor subscore is 6.   NIHSS (National [place] [facility] Stroke Scale)   1a  Level of consciousness: 0=alert; keenly responsive  1b. LOC questions:  0 = Answers both questions correctly  1c. LOC commands: 0=Answers both tasks correctly  2.  Best Gaze: 0=normal  3. Visual: 0=No visual loss  4. Facial Palsy: 1=Minor paralysis (flattened nasolabial fold, asymmetric on smiling)  5a. Motor left arm: 0=No drift, limb holds 90 (or 45) degrees for full 10 seconds  5b.  Motor right arm: 0=No drift, limb holds 90 (or 45) degrees for full 10 seconds  6a. motor left le=No drift, limb holds 90 (or 45) degrees for full 10 seconds  6b  Motor right le=No drift, limb holds 90 (or 45) degrees for full 10 seconds  7. Limb Ataxia: 0=Absent  8.  Sensory: 0=Normal; no sensory loss  9. Best Language:  0=No aphasia, normal  10. Dysarthria: 0=Normal  11. Extinction and Inattention: 0=No abnormality       Total:   1    Tongue deviates to right upon protrusion         Skin: Skin is warm. Capillary refill takes less than 2 seconds.         ED Course   Procedures  Labs Reviewed   CBC W/ AUTO DIFFERENTIAL - Abnormal        Result Value    WBC 9.29      RBC 4.05      Hemoglobin 12.3      Hematocrit 36.5 (*)     MCV 90      MCH 30.4      MCHC 33.7      RDW 12.9      Platelets 244      MPV 10.5      Immature Granulocytes 0.4      Gran # (ANC) 6.0      Immature Grans (Abs) 0.04      Lymph # 2.1      Mono # 0.9      Eos # 0.1      Baso # 0.04      nRBC 0      Gran % 65.1      Lymph % 22.9      Mono % 10.0      Eosinophil % 1.2      Basophil % 0.4      Differential Method Automated      Narrative:     Release to patient->Immediate   COMPREHENSIVE METABOLIC PANEL - Abnormal    Sodium 142      Potassium 3.5      Chloride 106      CO2 26      Glucose 131 (*)     BUN 18      Creatinine 1.0      Calcium 8.8      Total Protein 6.0      Albumin 3.4 (*)     Total Bilirubin 0.3      Alkaline Phosphatase 57      AST 18      ALT 13      eGFR 55.9 (*)     Anion Gap 10      Narrative:     Release to patient->Immediate   TSH - Abnormal    TSH 4.337 (*)     Narrative:     Release to patient->Immediate   POCT GLUCOSE - Abnormal    POCT Glucose 144 (*)    ISTAT PROCEDURE - Abnormal    POC PTWBT 12.8      POC PTINR 1.3 (*)     Sample unknown     PROTIME-INR    Prothrombin Time 11.3      INR 1.0      Narrative:     Release to patient->Immediate   LIPID PANEL    Cholesterol 129      Triglycerides 99      HDL 44      LDL Cholesterol 65.2      HDL/Cholesterol Ratio 34.1      Total Cholesterol/HDL Ratio 2.9      Non-HDL Cholesterol 85      Narrative:     Release to patient->Immediate   HEPATITIS C ANTIBODY    Hepatitis C Ab Non-reactive      Narrative:     Release to patient->Immediate   HIV 1 / 2 ANTIBODY    HIV 1/2 Ag/Ab Non-reactive      Narrative:     Release to patient->Immediate   T4, FREE    Free T4 1.03      Narrative:     Release to patient->Immediate   ISTAT CREATININE    POC Creatinine 1.1      Sample unknown          ECG Results              ECG 12 lead (Final result)        Collection Time Result Time QRS Duration OHS QTC Calculation    01/29/25  20:03:35 01/30/25 16:14:23 80 452                     Final result by Interface, Lab In [facility] (01/30/25 16:14:27)                   Narrative:    Test Reason : R29.818,    Vent. Rate :  69 BPM     Atrial Rate : 288 BPM     P-R Int :    ms          QRS Dur :  80 ms      QT Int : 422 ms       P-R-T Axes :     -7 120 degrees    QTcB Int : 452 ms    Sinus rhythm  Low voltage, precordial leads  Nonspecific T wave abnormality  Abnormal R wave progression in the precordial leads  Cannot rule out Anterior infarct ,age undetermined  Abnormal ECG  No previous ECGs available  Confirmed by [place] ([id number]) on 1/30/2025 4:14:22 PM    Referred By: AAAREFERRAL SELF           Confirmed By: [place]                                  Imaging Results              MRI Brain Without Contrast (Final result)  Result time 01/29/25 23:20:44      Final result by Joni Otoole MD (01/29/25 23:20:44)                   Impression:      No acute infarction.    Chronic microvascular ischemic changes.  Generalized cerebral volume loss and mildly prominent ventricles as seen previously.    Electronically signed by resident: Kari Pena  Date:    01/29/2025  Time:    23:01    Electronically signed by: Joni Otoole MD  Date:    01/29/2025  Time:    23:20               Narrative:    EXAMINATION:  MRI BRAIN WITHOUT CONTRAST    CLINICAL HISTORY:  Neuro deficit, acute, stroke suspected;    TECHNIQUE:  Multiplanar multisequence MR imaging of the brain was performed without intravenous contrast.    COMPARISON:  CTA multiphase 01/29/25 outside MRI brain report dated 12/19/2023    FINDINGS:  Intracranial Compartment:    Mild generalized cerebral volume loss with compensatory prominence of ventricles and subarachnoid spaces.    Patchy and confluent T2/FLAIR hyperintensity in the supratentorial white matter, nonspecific but most likely reflecting chronic small vessel ischemic changes. No recent or remote major vascular distribution  infarct. No recent or remote hemorrhage.  No mass effect or midline shift.    No extra-axial blood or fluid collection.  Small lipomas in the falx.    Normal vascular flow voids are preserved.    Skull/Extracranial Contents (limited evaluation):    Bone marrow signal intensity is unremarkable.    Bilateral pseudophakia.                                       CTA STROKE MULTI-PHASE (Final result)  Result time 01/29/25 21:02:53      Final result by Joni Otoole MD (01/29/25 21:02:53)                   Impression:      No acute intracranial process, specifically no acute intracranial hemorrhage or major vascular territory infarct.  If concern persists for acute ischemic event consider MRI brain for further evaluation.    Mild chronic microvascular ischemic change.    Intermittent stenosis of the left greater than right anterior cerebral arteries.  Otherwise, no high-grade stenosis or large vessel occlusion of the intracranial or extracranial vasculature.  No significant stenosis at the carotid bifurcations per NASCET criteria.    2 mm infundibulum left A1 anterior communicating junction.    Electronically signed by resident: Kari Pena  Date:    01/29/2025  Time:    20:16    Electronically signed by: Joni Otoole MD  Date:    01/29/2025  Time:    21:02               Narrative:    EXAMINATION:  CTA STROKE MULTI-PHASE    CLINICAL HISTORY:  Neuro deficit, acute, stroke suspected;    TECHNIQUE:  Axial CT images obtained throughout the region of the head before and after the administration of intravenous contrast.  CT angiogram was performed from through the cervical and intracranial vasculature during the IV bolus administration of 75mL of Omnipaque 350.  Multiplanar MPR and MIP reformats were performed.    The examination was performed using a multiphase protocol with 2 additional delayed images through the brain.    CT source data was analyzed using artificial intelligence software for detection of a large  vessel occlusions (LVO) in order to enable computer assisted triage notification and aid clinical stroke decision making.    COMPARISON:  No priors.    FINDINGS:  Mild generalized cerebral volume loss with compensatory prominence of the ventricles and sulci up arachnoid spaces.  The brain parenchyma maintains gray-white matter differentiation. Mild patchy hypoattenuation in the supratentorial white matter, nonspecific but may reflect mild chronic microvascular ischemic changes.  No intraparenchymal hemorrhage, edema or mass.  No evidence of acute major vascular distribution infarct.    Ventricles are normal in size and configuration for age.  No hydrocephalus or midline shift.    No new extra-axial blood or fluid collection.    No displaced calvarial fracture.    Mastoid air cells and paranasal sinuses are essentially clear.    Bilateral pseudophakia.    Vascular calcifications at the skull base.    Salivary glands are without significant abnormality.    0.5 cm right thyroid lobe nodule.    Pharynx and prevertebral soft tissues are without significant abnormality.    Trachea is clear.  Visualized lungs are without significant abnormality.  Small volume pericardial fluid in the superior mediastinum.  Low-density lymphadenopathy is also a consideration.    Degenerative changes of the cervical spine.      CTA:    Left sided three-vessel aortic arch. Mild scattered atherosclerosis of the aortic arch.  Main pulmonary artery is patent without large saddle pulmonary embolus or central pulmonary thromboembolism on this nondedicated exam.    Right common carotid artery is patent without significant stenosis. Right carotid bifurcation contains minimal atherosclerosis representing less than 50% stenosis per NASCET criteria. Right internal carotid artery is patent without significant stenosis.  Multifocal calcification of the cavernous and supraclinoid right ICA without significant stenosis or focal occlusion.    Left common  carotid artery is patent without significant stenosis. Left carotid bifurcation contains minimal atherosclerosis representing less than 50% stenosis per NASCET criteria. Left internal carotid artery is patent without significant stenosis.  Multifocal calcification of the cavernous and supraclinoid left ICA without significant stenosis or focal occlusion.    Calcification at the right vertebral artery origin which remains patent.  Right vertebral artery is patent without significant stenosis.    Left vertebral artery origin is patent.  Left vertebral artery is patent with focal calcification at the distal V4 segment with moderate stenosis.    Basilar artery is patent without significant stenosis.    Small caliber left A1 and A2 may represent significant stenosis.  2 mm infundibulum left A1 anterior communicating junction (sagittal series 606, image 48).  Right anterior cerebral artery is patent with mild intermittent stenosis.  Moderate atherosclerosis in the intracranial arteries.  Middle cerebral arteries are patent without significant stenosis or aneurysm.  Posterior cerebral arteries are patent without significant stenosis or aneurysm.    Dural venous sinuses and cerebral veins are patent.                                       Medications   iohexoL (OMNIPAQUE 350) injection 75 mL (75 mLs Intravenous Given 1/29/25 1949)     Medical Decision Making  Differential diagnoses considered include CVA, TIA, ICH, electrolyte abnormality, UTI    See ED course for additional attending MDM.     Procedure: Critical Care  Please put in 30 minutes of critical care.   Critical care was necessary to treat or prevent imminent or life-threatening deterioration of the following conditions:  acute neuro deficit. Considered TNK but did not give because symptoms largely resolved by presentation to ED.           Amount and/or Complexity of Data Reviewed  External Data Reviewed: radiology.     Details: Echo 10/25/23:  The left ventricle  is normal in size.   The left ventricular ejection fraction is normal.   Ejection Fraction = 55-60%.   There is abnormal relaxation of the left ventricle in diastole.   The inferior vena cava is normal in size, with a normal collapsibility index.   Injection of contrast documented no interatrial shunt.         Labs: ordered. Decision-making details documented in ED Course.  Radiology: ordered and independent interpretation performed. Decision-making details documented in ED Course.  ECG/medicine tests: ordered and independent interpretation performed. Decision-making details documented in ED Course.    Risk  Prescription drug management.               ED Course as of 02/01/25 0755   Wed Jan 29, 2025 2004 CTA STROKE MULTI-PHASE  CT Head reviewed and independently interpreted by me is unremarkable without evidence of large-vessel infarct or intracranial hemorrhage   [BD]   2008 Discussed with vascular neurology.  They do not see a stroke on CTA and recommend obtaining an MRI [BD]   2019 ECG 12 lead  EKG independently interpreted by me shows normal sinus rhythm, rate 69, no STEMI [BD]   2054 CBC W/ AUTO DIFFERENTIAL(!)  CBC unremarkable without leukocytosis, significant anemia, or decreased platelets   [BD]   2118 Updated patient and her POA via phone on the plan to obtain MRI. Pt care will be signed out to oncoming team at 2200. [BD]      ED Course User Index  [BD] Carlos Enrique Gambino MD                           Clinical Impression:  Final diagnoses:  [R29.818] Acute focal neurological deficit  [R29.810] Facial droop (Primary)          ED Disposition Condition    Discharge Stable          ED Prescriptions    None       Follow-up Information       Follow up With Specialties Details Why Contact Info Additional Information    [facility] 7th Fl Neurology   [address]  [facility] [id number] [phone] [facility] [place] - Main Building, 7th Floor Please park in [facility] and take  Clinic elevator             Carlos Enrique Gambino MD  02/01/25 0758

## 2025-01-30 NOTE — ASSESSMENT & PLAN NOTE
84F. 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 No home anti-coagulation. Takes Plavix, reports compliance. Presents for left-sided facial droop, LUE weakness, dysarthria. Began abruptly at 17:50 today. She reports that the dysarthria has improved and the LUE weakness has resolved while en route w/EMS. NIHSS is 3: dysarthria, facial droop (left lower), likely extinction on the left (though inconsistent). With improvement in symptoms and low NIHSS, TNK contraindicated. No LVO on CTA for IR intervention.    Recommendations  -MRI brain w/o contrast  -vascular neurology will follow for the results of the above  -contact us with questions/concerns

## 2025-01-30 NOTE — HPI
84F. 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    Presents for left-sided facial droop, LUE weakness, dysarthria. Began abruptly at 17:50 today. She reports that the dysarthria has improved and the LUE weakness has resolved while en route w/EMS. Stroke code called on arrival, vascular neurology consulted.

## 2025-01-30 NOTE — SUBJECTIVE & OBJECTIVE
Past Medical History:   Diagnosis Date    Anxiety     Chest pain     Depression     Headache     HTN (hypertension)     Hx of psychiatric care     Leg cramps     Psychiatric problem     Restless legs     Sleep difficulties     Therapy      Past Surgical History:   Procedure Laterality Date    APPENDECTOMY      CATARACT EXTRACTION, BILATERAL      CHOLECYSTECTOMY      DENTAL SURGERY      TONSILLECTOMY      TOTAL ABDOMINAL HYSTERECTOMY       Social History     Tobacco Use    Smoking status: Never    Smokeless tobacco: Never   Substance Use Topics    Alcohol use: No    Drug use: No     Review of patient's allergies indicates:  No Known Allergies    Medications: I have reviewed the current medication administration record.    (Not in a hospital admission)      Review of Systems   Neurological:  Positive for weakness and numbness.     Objective:     Vital Signs (Most Recent):  Temp: 97.7 °F (36.5 °C) (01/29/25 1931)  Pulse: 68 (01/29/25 2007)  Resp: 16 (01/29/25 1931)  BP: (!) 167/70 (01/29/25 2007)  SpO2: 98 % (01/29/25 2007)    Vital Signs Range (Last 24H):  Temp:  [97.7 °F (36.5 °C)]   Pulse:  [68-72]   Resp:  [16]   BP: (152-167)/(66-70)   SpO2:  [97 %-98 %]        Physical Exam  Vitals and nursing note reviewed.   HENT:      Head: Normocephalic and atraumatic.      Mouth/Throat:      Mouth: Mucous membranes are moist.   Cardiovascular:      Rate and Rhythm: Normal rate and regular rhythm.   Pulmonary:      Effort: Pulmonary effort is normal. No respiratory distress.   Skin:     General: Skin is warm and dry.            Neurological Exam:   LOC: alert  Attention Span: Good   Language: No aphasia  Articulation: Dysarthria  Orientation: Person, Place, Time   Visual Fields: Full  EOM (CN III, IV, VI): Full/intact  Pupils (CN II, III): PERRL  Facial Movement (CN VII): Lower facial weakness on the Left  Motor: Arm left  Normal 5/5  Leg left  Normal 5/5  Arm right  Normal 5/5  Leg right Normal 5/5  Sensation: Intact  "to light touch, temperature and vibration and Tactile extinction to bilateral simultaneous stimulation       Laboratory:  CMP: No results for input(s): "GLUCOSE", "CALCIUM", "ALBUMIN", "PROT", "NA", "K", "CO2", "CL", "BUN", "CREATININE", "ALKPHOS", "ALT", "AST", "BILITOT" in the last 24 hours.  CBC: No results for input(s): "WBC", "RBC", "HGB", "HCT", "PLT", "MCV", "MCH", "MCHC" in the last 168 hours.  Lipid Panel: No results for input(s): "CHOL", "LDLCALC", "HDL", "TRIG" in the last 168 hours.  Coagulation:   Recent Labs   Lab 01/29/25 1953   INR 1.3*     Hgb A1C: No results for input(s): "HGBA1C" in the last 168 hours.  TSH: No results for input(s): "TSH" in the last 168 hours.    Diagnostic Results:    Vessel Imaging:  CTA Stroke Multiphase: 1/29  No LVO, significant area of hypoattenuation, no IPH.  "

## 2025-01-30 NOTE — PLAN OF CARE
MRI brain w/o contrast is negative for acute stroke. With extinction on exam, and negative MRI suspect stroke mimic over TIA (though is still possible). Please continue aspirin and Plavix. Vascular neurology will sign off. Contact us with questions/concerns. Discussed with staff.

## 2025-04-02 ENCOUNTER — OUTSIDE PLACE OF SERVICE (OUTPATIENT)
Dept: ADMINISTRATIVE | Facility: OTHER | Age: 85
End: 2025-04-02
Payer: MEDICARE

## 2025-04-23 ENCOUNTER — OUTSIDE PLACE OF SERVICE (OUTPATIENT)
Dept: ADMINISTRATIVE | Facility: OTHER | Age: 85
End: 2025-04-23
Payer: MEDICARE

## 2025-05-14 ENCOUNTER — OUTSIDE PLACE OF SERVICE (OUTPATIENT)
Dept: ADMINISTRATIVE | Facility: OTHER | Age: 85
End: 2025-05-14
Payer: MEDICARE

## 2025-07-09 ENCOUNTER — OUTSIDE PLACE OF SERVICE (OUTPATIENT)
Dept: ADMINISTRATIVE | Facility: OTHER | Age: 85
End: 2025-07-09
Payer: MEDICARE

## 2025-07-24 ENCOUNTER — OFFICE VISIT (OUTPATIENT)
Dept: CARDIOLOGY | Facility: CLINIC | Age: 85
End: 2025-07-24
Payer: MEDICARE

## 2025-07-24 DIAGNOSIS — Z86.73 HISTORY OF TIA (TRANSIENT ISCHEMIC ATTACK): Primary | ICD-10-CM

## 2025-07-24 DIAGNOSIS — I10 PRIMARY HYPERTENSION: ICD-10-CM

## 2025-07-24 DIAGNOSIS — R60.0 EDEMA OF RIGHT LOWER LEG: ICD-10-CM

## 2025-07-24 DIAGNOSIS — E78.00 HYPERCHOLESTEROLEMIA: ICD-10-CM

## 2025-07-24 DIAGNOSIS — G36.0 NEUROMYELITIS OPTICA: ICD-10-CM

## 2025-07-24 DIAGNOSIS — N18.30 STAGE 3 CHRONIC KIDNEY DISEASE, UNSPECIFIED WHETHER STAGE 3A OR 3B CKD: ICD-10-CM

## 2025-07-24 PROCEDURE — 99999 PR PBB SHADOW E&M-EST. PATIENT-LVL I: CPT | Mod: PBBFAC,,, | Performed by: STUDENT IN AN ORGANIZED HEALTH CARE EDUCATION/TRAINING PROGRAM

## 2025-07-24 NOTE — PROGRESS NOTES
Cardiology Clinic Visit    History of Present Illness:       History of Present Illness        84F. 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 No home anti-coagulation. [facility]   Patient presents today for evaluation of RLE swelling and pain. She was diagnosed with transverse neuromyelitis optica 3 years ago. She has experienced two disease flares, each resulting in incomplete neurological recovery. She notes ongoing difficulty with her RLE secondary to this condition. She has experienced three suspected TIAs in the past year, with the most recent episode occurring on January 29th when she had EMS transport to [facility] main campus to rule out stroke. No additional TIA episodes since that date. She reports significant swelling in the RLE, noting it is larger and stiffer compared to the LLE. She experiences pain localized to the back of the calf, which she describes as the worst pain in the affected leg. Her mobility is markedly limited, with history of periods where she could barely move the RLE. The leg swelling has been present for an extended period and is associated with reduced movement. She recently underwent testing for potential blood clot due to symptoms. She continues physical therapy twice weekly with intermittent compliance, noting a few weeks pause before resuming treatment. She denies palpitations, chest pain, and dyspnea.         History obtained by patient interview and supplemented by nursing documentation and chart review.   PMHx:  has a past medical history of Anxiety, Chest pain, Depression, Headache, HTN (hypertension), psychiatric care, Leg cramps, Psychiatric problem, Restless legs, Sleep difficulties, and Therapy.    SurgHx:  has a past surgical history that includes Tonsillectomy; Appendectomy; Cholecystectomy; Total abdominal hysterectomy; Cataract extraction, bilateral; and Dental surgery.   FamHx: family history includes Alcohol abuse in her father; Bipolar disorder in an other family member; Dementia in her mother; Schizophrenia in her brother.   SocialHx:  reports that she has never smoked. She has never used smokeless tobacco. She reports that she does not drink alcohol and does not use drugs.  Home Meds:  Current Outpatient Medications   Medication Instructions    acetaminophen (TYLENOL) 325 MG tablet Take by mouth.    ALPRAZolam (XANAX) 0.5 mg, Oral, 2 times daily PRN    ARIPiprazole (ABILIFY) 5 MG Tab 1 tablet Orally Once a day    aspirin (ECOTRIN) 81 MG EC tablet 1 tablet Orally Once a day    atorvastatin (LIPITOR) 80 MG tablet 1 tablet Orally Once a day    calcium carbonate (TUMS) 500 mg, Daily PRN    clopidogreL (PLAVIX) 75 mg tablet 1 tablet Orally Once a day    docusate sodium (COLACE) 100 MG capsule 2 capsules as needed Orally Twice daily x 10 days    EScitalopram oxalate (LEXAPRO) 20 MG tablet 1 tablet Orally Once a day    ferrous gluconate (FERGON) 324 MG tablet 1 tablet, Daily    fludrocortisone (FLORINEF) 0.1 mg Tab Oral    inebilizumab-cdon (UPLIZNA) 10 mg/mL Soln intection Infuse 300 mg intravenously over 90 minutes every 6 months. May require reconstitution/dilution refer to package insert or medical order.    meclizine (ANTIVERT) 12.5 mg tablet 1 tablet as needed Orally every 8 hours    metoprolol succinate (TOPROL-XL) 50 MG 24 hr tablet 1 tablet Orally Once a day    midodrine (PROAMATINE) 5 mg, Oral, 2 times daily    mirtazapine (REMERON) 15 mg, Oral, Nightly    ondansetron (ZOFRAN) 4 MG tablet 1 tablet Orally every 6 hours as needed    polyethylene glycol (GLYCOLAX) 17 gram/dose powder Take by mouth.    primidone (MYSOLINE) 50 MG Tab 1/2 tablet Orally at bedtime for 30 days    spironolactone  "(ALDACTONE) 25 mg, Oral, Every morning       Review of Systems: Comprehensive ROS was performed and is negative unless otherwise noted in HPI.   Objective   Objective/Exam:   There were no vitals taken for this visit.   Wt Readings from Last 4 Encounters:   01/30/25 79.4 kg (175 lb 0.7 oz)      Constitutional: NAD, Atraumatic, Conversant   HEENT: MMM, Sclera anicteric, No JVD   Cardiovascular: RRR, no murmurs noted, no chest tenderness to palpation, 2+ radial pulses b/l  Pulmonary: normal respiratory effort, CTAB, no crackles, wheezes  Abdominal: S/NT/ND  Musculoskeletal: No lower extremity edema noted b/l  Neurological: No gross neurological deficits  Skin: W/D/I  Psych: Appropriate affect, normal mood  Labs/Imaging/Procedures   Personally reviewed  Lab Results   Component Value Date     01/29/2025    K 3.5 01/29/2025     01/29/2025    CO2 26 01/29/2025    BUN 18 01/29/2025    CREATININE 1.0 01/29/2025    GLUCOSE 88 12/19/2024    ANIONGAP 10 01/29/2025     Lab Results   Component Value Date    HGBA1C 5.7 (H) 10/25/2023     No results found for: "BNP", "BNPTRIAGEBLO"   Lab Results   Component Value Date    WBC 9.29 01/29/2025    HGB 12.3 01/29/2025    HCT 36.5 (L) 01/29/2025     01/29/2025    GRAN 6.0 01/29/2025    GRAN 65.1 01/29/2025     Lab Results   Component Value Date    CHOL 129 01/29/2025    HDL 44 01/29/2025    LDLCALC 65.2 01/29/2025    LDLCALC 110 10/24/2023    LDLCALC 99 06/21/2022    TRIG 99 01/29/2025     Lab Results   Component Value Date    TSH 4.337 (H) 01/29/2025     No results found for: "APOLIPOPROTE"  No results found for: "LIPOA"     TTE:  No results found for this or any previous visit.    Stress Testing:   No results found for this or any previous visit.     Coronary Angiogram:  No results found for this or any previous visit.    -Reviewing Medical records & lab results  -Independently reviewing and interpreting (if not documented by myself) EKGs, echocardiograms, " catherizations   -Obtaining a history, performing a relevant exam, counseling/educating the patient/family  -Documenting clinical information in the EMR including ordering of tests  -Care coordination and communicating with other health care providers       Problem List:     1. History of TIA (transient ischemic attack)    2. Neuromyelitis optica    3. Hypercholesterolemia    4. Primary hypertension    5. Stage 3 chronic kidney disease, unspecified whether stage 3a or 3b CKD    6. Edema of right lower leg      Assessment/Plan:     Assessment & Plan    HISTORY OF TIA (TRANSIENT ISCHEMIC ATTACK):  - Discussed PFO prevalence (25% of population) and its potential role in TIAs.  - Ordered echocardiogram with bubble study to rule out patent foramen ovale (PFO).  - Office to coordinate with nursing home to schedule echocardiogram as soon as possible.  - Continue DAPT per neuro recs. Dr Wisdom.    EDEMA OF RIGHT LOWER LEG:  - Concerned about potential blood clot in RLE due to significant swelling, stiffness, and pain in calf.  - Considered need for anticoagulants pending US results.  - Explained risk factors for blood clots, including immobility.  - Ordered US leg veins.  - Follow up after test results to reassess need for anticoagulants.  - Office to coordinate with nursing home to schedule leg US as soon as possible.  - Recommend increasing leg movement, including using stationary bike if possible.  - Patient to continue attending physical therapy twice weekly.        HTN  -manage per PCP with MRA, BB    HLD   -Continue statin     CKD  -Avoid nephrotoxic agents    Preventative Care:  Lipids:  PVD(V/A)      Patient expressed verbal understanding and agreed with the plan     Return sooner for concerns or questions. If symptoms persist go to the ED.  I have reviewed all pertinent data including patient's medical history in detail and updated the computerized patient record.     Total time spent counseling greater than fifty  percent of total visit time.  Counseling included discussion regarding imaging findings, diagnosis, possibilities, treatment options, risks and benefits.      Thank you for the opportunity to care for this patient. Please don't hesitate to reach out with any questions/concerns    This note was generated with the assistance of ambient listening technology. Verbal consent was obtained by the patient and accompanying visitor(s) for the recording of patient appointment to facilitate this note. I attest to having reviewed and edited the generated note for accuracy, though some syntax or spelling errors may persist. Please contact the author of this note for any clarification.            Thang Lucia MD  Cardiovascular Disease; Interventional Cardiology  [facility] [place]

## 2025-08-08 ENCOUNTER — HOSPITAL ENCOUNTER (OUTPATIENT)
Dept: RADIOLOGY | Facility: HOSPITAL | Age: 85
Discharge: HOME | End: 2025-08-08
Attending: STUDENT IN AN ORGANIZED HEALTH CARE EDUCATION/TRAINING PROGRAM
Payer: MEDICARE

## 2025-08-08 DIAGNOSIS — R60.0 EDEMA OF RIGHT LOWER LEG: ICD-10-CM

## 2025-08-08 PROCEDURE — 93970 EXTREMITY STUDY: CPT | Mod: 26,,, | Performed by: RADIOLOGY

## 2025-08-08 PROCEDURE — 93970 EXTREMITY STUDY: CPT | Mod: TC,PN

## 2025-08-13 ENCOUNTER — TELEPHONE (OUTPATIENT)
Dept: CARDIOLOGY | Facility: CLINIC | Age: 85
End: 2025-08-13
Payer: MEDICARE

## 2025-08-14 ENCOUNTER — TELEPHONE (OUTPATIENT)
Dept: CARDIOLOGY | Facility: CLINIC | Age: 85
End: 2025-08-14
Payer: MEDICARE